# Patient Record
Sex: FEMALE | Race: WHITE
[De-identification: names, ages, dates, MRNs, and addresses within clinical notes are randomized per-mention and may not be internally consistent; named-entity substitution may affect disease eponyms.]

---

## 2019-07-30 ENCOUNTER — HOSPITAL ENCOUNTER (EMERGENCY)
Dept: HOSPITAL 33 - ED | Age: 34
Discharge: HOME | End: 2019-07-30
Payer: COMMERCIAL

## 2019-07-30 VITALS — HEART RATE: 97 BPM | SYSTOLIC BLOOD PRESSURE: 168 MMHG | DIASTOLIC BLOOD PRESSURE: 101 MMHG | OXYGEN SATURATION: 96 %

## 2019-07-30 DIAGNOSIS — K04.7: ICD-10-CM

## 2019-07-30 DIAGNOSIS — K02.9: Primary | ICD-10-CM

## 2019-07-30 PROCEDURE — 99283 EMERGENCY DEPT VISIT LOW MDM: CPT

## 2019-07-30 NOTE — ERPHSYRPT
- History of Present Illness


Time Seen by Provider: 07/30/19 19:15


Source: patient


Exam Limitations: no limitations


Patient Subjective Stated Complaint: Right side of  jaw swollen due to an 

abscessed bottom tooth in the mouth, began 3 days ago, has dentist appt for 

August 26th for an extraction


Triage Nursing Assessment: Pt rates pain 8/10, hypertensive, denies any other 

issues


Physician History: 





34-year-old white female arrives with complaint of pain in her right lower 

mandibular region symptoms for 3 days she states she has some swelling in the 

area.


She states she has an appointment with a dentist on August 26.


She has no fever no nausea no vomiting.


Past medical history is negative.


Past surgical history cholecystectomy and tubal ligation


Timing/Duration: day(s) (3 days)


Severity: moderate


Modifying Factors: Worsens With: eating, immobilization, medication, movement, 

rest, acetaminophen, ibuprofen, nothing


Associated Symptoms: No nausea, No vomiting, No abdominal pain, No shortness of 

breath, No heartburn, No diaphoresis, No cough, No chills, No chest pain, No 

fever, No headaches, No loss of appetite, No malaise, No rash, No syncope, No 

seizure, No weakness


Allergies/Adverse Reactions: 








No Known Drug Allergies Allergy (Unverified 07/30/19 18:56)


 








- Review of Systems


Constitutional: No Fever, No Chills


Eyes: No Symptoms


Ears, Nose, & Throat: Mouth Pain, No Ear Pain, No Ear Discharge, No Hearing 

Changes, No Tinnitus, No Nose Pain, No Nose Congestion, No Nose Discharge, No 

Sinus Drainage, No Epistaxis, No Mouth Swelling, No Loose Teeth, No Throat Pain

, No Throat Swelling, No Hoarse, No Painful Swallowing, No Snoring, No Stridor, 

No Other


Respiratory: No Cough, No Dyspnea


Cardiac: No Chest Pain, No Edema, No Syncope


Abdominal/Gastrointestinal: No Abdominal Pain, No Nausea, No Vomiting, No 

Diarrhea


Genitourinary Symptoms: No Dysuria


Musculoskeletal: No Back Pain, No Neck Pain


Skin: No Rash


Neurological: No Dizziness, No Focal Weakness, No Sensory Changes


Psychological: No Symptoms


Endocrine: No Symptoms


All Other Systems: Reviewed and Negative





- Past Medical History


Pertinent Past Medical History: No





- Past Surgical History


Past Surgical History: Yes


Gastrointestinal: Cholecystectomy


Female Surgical History: Tubal Ligation





- Social History


Smoking Status: Current every day smoker


How long have you smoked: 20 years


Exposure to second hand smoke: Yes


Drug Use: none


Patient Lives Alone: No





- Female History


Hx Pregnant Now: Yes (tubal)





- Nursing Vital Signs


Nursing Vital Signs: 





 Initial Vital Signs











Temperature  98.4 F   07/30/19 18:50


 


Pulse Rate  100 H  07/30/19 18:50


 


Blood Pressure  157/103   07/30/19 18:50


 


O2 Sat by Pulse Oximetry  99   07/30/19 18:50








 Pain Scale











Pain Intensity                 8

















- Physical Exam


General Appearance: mild distress, alert


Eye Exam: PERRL/EOMI, eyes nml inspection


Ears, Nose, Throat Exam: normal ENT inspection, TMs normal, pharynx normal, 

moist mucous membranes, dry mucous membranes, TM abnormal (R), TM abnormal (L), 

pharyngeal erythema, tonsillar exudate, other (carious tooth right posterior 

mandibular region mild edema to face overlying this tender in this area)


Neck Exam: normal inspection, non-tender, supple, full range of motion


Respiratory Exam: normal breath sounds, lungs clear, No respiratory distress


Cardiovascular Exam: regular rate/rhythm, normal heart sounds, normal 

peripheral pulses, capillary refill <2 sec


Gastrointestinal/Abdomen Exam: soft, normal bowel sounds, No tenderness, No mass


Back Exam: normal inspection, normal range of motion, No CVA tenderness, No 

vertebral tenderness


Extremity Exam: normal inspection, normal range of motion, pelvis stable


Neurologic Exam: alert, oriented x 3, cooperative, CNs II-XII nml as tested, 

normal mood/affect, nml cerebellar function, nml station & gait, sensation nml, 

No motor deficits


Skin Exam: normal color, warm, dry, No rash


**SpO2 Interpretation**: normal (99%)


SpO2: 99





- Course


Nursing assessment & vital signs reviewed: Yes





- Progress


Progress: improved


Progress Note: 





07/30/19 19:22


Is a 34-year-old white female previously healthy she arrives with complaint of 

pain in the right posterior mandibular region symptoms for 3 days she has a 

corresponding carious tooth.


She states she's been taking Advil however she states she's not achieving pain 

control with this.


Will go ahead and place patient on amoxicillin 500 mg orally 3 times a day 

Norco for pain.  Inspect it did not find any narcotic prescriptions for this 

patient.








Patient will be encouraged followup with her dentist





- Departure


Departure Disposition: Home


Clinical Impression: 


 Dental caries, Pain, dental, Dental abscess





Condition: Fair


Critical Care Time: No


Referrals: 


DOCTOR,NO FAMILY [Primary Care Provider] - 


Additional Instructions: 


Return home.


Amoxicillin 500 mg orally 3 times a day for 10 days.


Norco as prescribed.


Cold packs to area (external ) 24-48 hours.


Continue Advil every 6 hours as needed.


Avoid excessively hot or cold foods.


Followup with your dentist.


Return for acute distress or for severe symptoms.


Prescriptions: 


Hydrocodone/APAP 5-325 Tab^^^ [Norco 5-325 Tablet^^^] 1 each PO Q4HPRN PRN #12 

tablet MDD 6


 PRN Reason: dental pain


Amoxicillin 500 mg PO TID #30 capsule

## 2019-08-01 ENCOUNTER — HOSPITAL ENCOUNTER (EMERGENCY)
Dept: HOSPITAL 33 - ED | Age: 34
Discharge: HOME | End: 2019-08-01
Payer: COMMERCIAL

## 2019-08-01 VITALS — HEART RATE: 87 BPM | DIASTOLIC BLOOD PRESSURE: 93 MMHG | SYSTOLIC BLOOD PRESSURE: 157 MMHG

## 2019-08-01 VITALS — OXYGEN SATURATION: 99 %

## 2019-08-01 DIAGNOSIS — K04.7: Primary | ICD-10-CM

## 2019-08-01 DIAGNOSIS — K08.89: ICD-10-CM

## 2019-08-01 PROCEDURE — 96372 THER/PROPH/DIAG INJ SC/IM: CPT

## 2019-08-01 PROCEDURE — 99283 EMERGENCY DEPT VISIT LOW MDM: CPT

## 2019-08-01 NOTE — ERPHSYRPT
- History of Present Illness


Time Seen by Provider: 08/01/19 01:50


Source: patient


Patient Subjective Stated Complaint: pt is alert and oriented. pt is ambulatory 

with a steady gait. pt comes in with c/o right jaw pain. pt was seen yesterday 

in the ER for same complaint. pt has worsening swelling and pain. pt states 

that she moved her dental appointment up to next wednesday and went to followup 

at [facility]. pt was given Amoxicillin 500mg in ER yesterday and then 

at the clinic they gave her another round of Amoxicillin 500mg to take after 

her first round. pt has severe swelling to her left jaw and lip.


Triage Nursing Assessment: see above


Physician History: 





34-year-old white female seen 2 days ago secondary to dental caries dental 

abscess patient with pain in the posterior right mandibular region seen here 2 

days ago placed on amoxicillin given [place].


Patient states that she had increased swelling in her mouth she was seen 

yesterday morning at a local clinic.


She tried to contact her dentist in the dentist apparently move her appointment 

up from the end of August 2 next week.





Patient arrives she has a large amount of swelling in her right lateral 

mandibular region.


Patient without fevers no nausea no vomiting.





Past medical history is negative.


Past surgical history includes cholecystectomy and tubal ligation.


Allergies/Adverse Reactions: 








No Known Drug Allergies Allergy (Unverified 07/30/19 18:56)


 





Immunizations Up to Date: Yes





- Review of Systems


Constitutional: No Fever, No Chills


Eyes: No Symptoms


Ears, Nose, & Throat: No Symptoms (again so she every, hears all night I can he 

wants anymore), Mouth Pain, Mouth Swelling, Other (dental pain, increasing 

dental abscess), No Ear Pain, No Ear Discharge, No Hearing Changes, No Tinnitus

, No Nose Congestion, No Nose Discharge, No Sinus Drainage, No Epistaxis, No 

Loose Teeth, No Throat Pain, No Throat Swelling, No Hoarse, No Painful 

Swallowing, No Snoring, No Stridor


Respiratory: No Cough, No Dyspnea


Cardiac: No Chest Pain, No Edema, No Syncope


Abdominal/Gastrointestinal: No Abdominal Pain, No Nausea, No Vomiting, No 

Diarrhea


Genitourinary Symptoms: No Dysuria


Musculoskeletal: No Back Pain, No Neck Pain


Skin: No Rash


Neurological: No Dizziness, No Focal Weakness, No Sensory Changes


Psychological: No Symptoms


Endocrine: No Symptoms


All Other Systems: Reviewed and Negative





- Past Medical History


Pertinent Past Medical History: No





- Past Surgical History


Past Surgical History: Yes


Gastrointestinal: Cholecystectomy


Female Surgical History: Tubal Ligation





- Social History


Smoking Status: Current every day smoker


How long have you smoked: 20 years


Exposure to second hand smoke: Yes


Drug Use: none


Patient Lives Alone: No





- Female History


Hx Pregnant Now: No





- Nursing Vital Signs


Nursing Vital Signs: 


 Initial Vital Signs











Temperature  98.4 F   08/01/19 01:38


 


Pulse Rate  97 H  08/01/19 01:38


 


Respiratory Rate  18   08/01/19 01:38


 


Blood Pressure  122/87   08/01/19 01:38


 


O2 Sat by Pulse Oximetry  99   08/01/19 01:38








 Pain Scale











Pain Intensity                 7

















- Physical Exam


General Appearance: moderate distress, alert


Eye Exam: PERRL/EOMI, eyes nml inspection


Ears, Nose, Throat Exam: other (dental caries right mandibular molars large 

dental abscess right lateral mandibular region)


Neck Exam: normal inspection, non-tender, supple, full range of motion


Respiratory Exam: normal breath sounds, lungs clear, No respiratory distress


Cardiovascular Exam: regular rate/rhythm, normal heart sounds, normal 

peripheral pulses, capillary refill <2 sec


Gastrointestinal/Abdomen Exam: soft, normal bowel sounds, No tenderness, No mass


Back Exam: normal inspection, normal range of motion, No CVA tenderness, No 

vertebral tenderness


Extremity Exam: normal inspection, normal range of motion, pelvis stable


Neurologic Exam: alert, oriented x 3, cooperative, CNs II-XII nml as tested, 

normal mood/affect, nml cerebellar function, nml station & gait, sensation nml, 

No motor deficits


Skin Exam: normal color, warm, dry, No rash


**SpO2 Interpretation**: normal (99%)


SpO2: 99





- Course


Nursing assessment & vital signs reviewed: Yes


Ordered Tests: 


 Active Orders 24 hr











 Category Date Time Status


 


 IV Insertion STAT Care  08/01/19 01:54 Active








Medication Summary














Discontinued Medications














Generic Name Dose Route Start Last Admin





  Trade Name Freq  PRN Reason Stop Dose Admin


 


Clindamycin Phosphate  600 mg  08/01/19 02:36  08/01/19 03:00





  Cleocin Phosphate Iv 600 Mg/4 Ml***  IM  08/01/19 02:37  600 mg





  STAT ONE   Administration





     





     





     





     


 


Clindamycin Phosphate  Confirm  08/01/19 02:40  





  Cleocin Phosphate Iv 600 Mg/4 Ml***  Administered  08/01/19 02:41  





  Dose   





  600 mg   





  .ROUTE   





  .STK-MED ONE   





     





     





     





     


 


Clindamycin HCl/Dextrose  600 mg in 50 mls @ 100 mls/hr  08/01/19 01:53  08/01/ 19 02:34





  Clindamycin-D5w 600 Mg/50 Ml***  IV  08/01/19 02:22  Not Given





  STAT STA   





     





     





     





     


 


Clindamycin HCl/Dextrose  Confirm  08/01/19 02:15  





  Clindamycin-D5w 600 Mg/50 Ml***  Administered  08/01/19 02:16  





  Dose   





  600 mg in 50 mls @ ud   





  IV   





  .STK-MED ONE   





     





     





     





     














- Progress


Progress: improved


Progress Note: 





08/01/19 01:58


34-year-old white female seen here 2 days ago secondary to dental abscess she 

is having increasing swelling in the right mandibular region continued pain in 

the right posterior molar secondary to a carious tooth.


Patient is on amoxicillin she apparently was seen by a local clinic and was 

given antibiotics to take after the antibiotics I have prescribed 2 days ago 

but they were the same ones.





Will go ahead and write for clindamycin 600 mg im and planfor  the patient to 

go home with clindamycin 300 mg orally 3 times a day for 10 days she is to 

place cold packs on the area.


I have recommended that the patient to followup with a dentist.


08/01/19 02:54








- Departure


Departure Disposition: Home


Clinical Impression: 


 Dental abscess, Pain, dental





Condition: Fair


Critical Care Time: No


Referrals: 


DOCTOR,NO FAMILY [Primary Care Provider] - 


Additional Instructions: 


Return home.


Clindamycin 300 mg orally 3 times a day for 10 days.


Cold packs to the area 24-48 hours.  (External)


Continue Norco as prescribed and or Advil every 6 hours as needed for pain.


Followup with your dentist.


Return for acute distress severe symptoms or for any problems.





Prescriptions: 


Clindamycin HCl 300 mg PO TID #30 capsule

## 2021-04-22 ENCOUNTER — HOSPITAL ENCOUNTER (EMERGENCY)
Dept: HOSPITAL 33 - ED | Age: 36
Discharge: HOME | End: 2021-04-22
Payer: COMMERCIAL

## 2021-04-22 VITALS — SYSTOLIC BLOOD PRESSURE: 164 MMHG | OXYGEN SATURATION: 98 % | HEART RATE: 84 BPM | DIASTOLIC BLOOD PRESSURE: 95 MMHG

## 2021-04-22 DIAGNOSIS — K04.7: Primary | ICD-10-CM

## 2021-04-22 PROCEDURE — 99283 EMERGENCY DEPT VISIT LOW MDM: CPT

## 2021-04-22 NOTE — ERPHSYRPT
- History of Present Illness


Time Seen by Provider: 04/22/21 19:42


Source: patient


Exam Limitations: no limitations


Patient Subjective Stated Complaint: pt states she has bery bad teeth and has 

been having pain for approx 3 days. states she has a dentist appt set up but not

until next week


Triage Nursing Assessment: pt alert and oriented, answers questions approp. pt 

ambulatory with steady gait noted, respirations nonlabored with lungs cta. skin 

warm and dry. broken teeth noted to lt upper mouth.


Physician History: 





This is a 36-year-old obese white female who has known chronic poor dentition 

and presents with left upper molar region dental pain.  Patient has a dentist 

appointment next week.  She has been using ibuprofen and naproxen as well as 

Orajel over-the-counter.  This is not helping her pain.  She has not had a 

fever.  And there are no intraoral/gingival abscesses present


Severity: mild


ENT Location: dental


Prearrival Treatment: over the counter meds


Associated Symptoms: tooth pain


Allergies/Adverse Reactions: 








No Known Drug Allergies Allergy (Verified 04/22/21 19:36)


   





Home Medications: 








Alprazolam [Xanax] 0.5 mg PO BIDPRN PRN 04/22/21 [History]


Venlafaxine HCl ER 75 mg*** [Effexor XR 75 MG***] 75 mg PO DAILY 04/22/21 

[History]





Hx Tetanus, Diphtheria Vaccination/Date Given: Yes


Hx Influenza Vaccination/Date Given: No


Hx Pneumococcal Vaccination/Date Given: No


Immunizations Up to Date: Yes





Travel Risk





- International Travel


Have you traveled outside of the country in past 3 weeks: No





- Coronavirus Screening


Are you exhibiting any of the following symptoms?: No


Close contact with a COVID-19 positive Pt in past 14-21 Days: No





- Vaccine Status


Have you recieved a Covid-19 vaccination: No





- Review of Systems


Constitutional: No Symptoms


Eyes: No Symptoms


Ears, Nose, & Throat: Other (Dental pain)


Respiratory: No Symptoms


Cardiac: No Symptoms


Abdominal/Gastrointestinal: No Symptoms


Genitourinary Symptoms: No Symptoms


Musculoskeletal: No Symptoms


Skin: No Symptoms


Neurological: No Symptoms


Psychological: No Symptoms


Endocrine: No Symptoms


Hematologic/Lymphatic: No Symptoms


Immunological/Allergic: No Symptoms


All Other Systems: Reviewed and Negative





- Past Medical History


Pertinent Past Medical History: No





- Past Surgical History


Past Surgical History: Yes


Gastrointestinal: Cholecystectomy


Female Surgical History: Tubal Ligation





- Social History


Smoking Status: Current every day smoker


How long have you smoked: 20 years


Exposure to second hand smoke: Yes


Drug Use: none


Patient Lives Alone: No





- Female History


Hx Last Menstrual Period: 2 days


Hx Pregnant Now: No





- Nursing Vital Signs


Nursing Vital Signs: 


                               Initial Vital Signs











Temperature  97.9 F   04/22/21 19:26


 


Pulse Rate  90   04/22/21 19:26


 


Respiratory Rate  18   04/22/21 19:26


 


Blood Pressure  183/105   04/22/21 19:26


 


O2 Sat by Pulse Oximetry  97   04/22/21 19:26








                                   Pain Scale











Pain Intensity                 7

















- Physical Exam


General Appearance: no apparent distress, alert, anxiety


Eye Exam: bilateral eye: normal inspection, PERRL, EOMI


Ear Exam: bilateral ear: auricle normal


Nasal Exam: normal inspection


Throat Exam: pharynx normal, dental tenderness (Patient has most tenderness in 

the left upper molar regions.  She has generalized dental decay with multiple 

fractured teeth present)


Neck Exam: normal inspection, non-tender, supple, full range of motion, trachea 

midline


Cardiovascular/Respiratory Exam: chest non-tender, normal breath sounds, regular

 rate/rhythm, heart sounds normal, No no ecchymosis, No no respiratory distress


Abdominal Exam: non-tender


Neurologic Exam: alert, oriented x 3, cooperative, CNs II-XII nml as tested, 

normal mood/affect, nml cerebellar function, nml station & gait, sensation nml


Skin Exam: normal color, warm, dry


**SpO2 Interpretation**: normal


SpO2: 97


O2 Delivery: Room Air





- Progress


Progress: unchanged


Counseled pt/family regarding: diagnosis, need for follow-up





- Departure


Departure Disposition: Home


Clinical Impression: 


 Dental infection





Condition: Stable


Critical Care Time: No


Referrals: 


YAA PAYNE [Primary Care Provider] - 


Additional Instructions: 


Continue using ibuprofen or naproxen (not both) for pain control.  May also 

continue using Orajel.  After you use the take-home Norco tablets, began adding 

Tylenol 500 mg orally every 6 hours to pain control regimen.  Call your dentist 

or primary care doctor for further management of your pain.  Follow-up with a 

dentist for definitive care.


Prescriptions: 


Amoxicillin 500 mg Cap*** [Amoxil 500 mg***] 500 mg PO TID #30 capsule

## 2021-06-08 ENCOUNTER — HOSPITAL ENCOUNTER (EMERGENCY)
Dept: HOSPITAL 33 - ED | Age: 36
Discharge: HOME | End: 2021-06-08
Payer: COMMERCIAL

## 2021-06-08 VITALS — DIASTOLIC BLOOD PRESSURE: 98 MMHG | SYSTOLIC BLOOD PRESSURE: 164 MMHG | HEART RATE: 84 BPM | OXYGEN SATURATION: 98 %

## 2021-06-08 DIAGNOSIS — R10.9: ICD-10-CM

## 2021-06-08 DIAGNOSIS — N39.0: Primary | ICD-10-CM

## 2021-06-08 LAB
GLUCOSE UR-MCNC: NEGATIVE MG/DL
PROT UR STRIP-MCNC: 30 MG/DL
RBC #/AREA URNS HPF: (no result) /HPF (ref 0–2)
WBC #/AREA URNS HPF: >100 /HPF (ref 0–5)

## 2021-06-08 PROCEDURE — 87186 SC STD MICRODIL/AGAR DIL: CPT

## 2021-06-08 PROCEDURE — 84703 CHORIONIC GONADOTROPIN ASSAY: CPT

## 2021-06-08 PROCEDURE — 99284 EMERGENCY DEPT VISIT MOD MDM: CPT

## 2021-06-08 PROCEDURE — 87086 URINE CULTURE/COLONY COUNT: CPT

## 2021-06-08 PROCEDURE — 81001 URINALYSIS AUTO W/SCOPE: CPT

## 2021-06-08 PROCEDURE — 96372 THER/PROPH/DIAG INJ SC/IM: CPT

## 2021-06-08 PROCEDURE — 87077 CULTURE AEROBIC IDENTIFY: CPT

## 2021-06-08 PROCEDURE — 74176 CT ABD & PELVIS W/O CONTRAST: CPT

## 2021-06-08 NOTE — XRAY
Indication: Right flank pain.  Hematuria.



Multiple contiguous axial images obtained through the abdomen and pelvis

without contrast using renal stone protocol.



Comparison: None



Lung bases demonstrates medial right middle lobe subsegmental

atelectasis/scarring.  No infiltrate or effusion.  Heart not enlarged.



No renal calculus or evidence for obstructive uropathy in either  system.

Stomach is distended with food.  Noncontrasted stomach and bowel loops appear

nonobstructed.  Normal appendix.  Previous cholecystectomy.  No free fluid/air.



Remaining liver, pancreas, spleen, adrenal glands, kidneys, ureters, bladder,

uterus, and aorta appear unremarkable for noncontrast exam.



Osseous structures intact.  No ventral or inguinal hernias.



Impression: Negative CT abdomen/pelvis without contrast exam.

## 2021-06-08 NOTE — ERPHSYRPT
- History of Present Illness


Time Seen by Provider: 06/08/21 12:30


Source: patient


Exam Limitations: no limitations


Patient Subjective Stated Complaint: to er c/o severe right side flank and 

pelvic pain x 4 days. pt reports nausea present and frequent urination


Triage Nursing Assessment: To er c/o right flank sharp stabbing pains with 

nausea/ pt arrives p/w/d resp easy a20x3. pt apprears to be uncomfortable in 

cot. pt /106 pt states this is not normal for her.


Physician History: 


Patient is a 36-year-old female presents to our ED with complaints of right-

sided flank pain that has been ongoing for 4 days.  Patient also associates 

frequency and dysuria with her symptoms.  Patient describes a pressure sensation

over her bladder.  Symptoms started gradually and progressed in intensity.  No 

trauma.  No fever.  No nausea or vomiting.  Symptoms are progressive.  Symptoms 

are moderate in intensity.  No specific worsening or improving factors.  Patient

voices no other complaints or concerns at this time.





Timing/Duration: day(s) (4 days ago)


Severity: moderate


Modifying Factors: Improves With: nothing


Associated Symptoms: nausea (Occasional nausea.  Not nauseous at this time.)


Allergies/Adverse Reactions: 








No Known Drug Allergies Allergy (Verified 04/22/21 19:36)


   





Home Medications: 








Alprazolam [Xanax] 0.5 mg PO BIDPRN PRN 04/22/21 [History]


Venlafaxine HCl ER 75 mg*** [Effexor XR 75 MG***] 75 mg PO DAILY 04/22/21 

[History]





Hx Tetanus, Diphtheria Vaccination/Date Given: Yes


Hx Influenza Vaccination/Date Given: No


Hx Pneumococcal Vaccination/Date Given: No





Travel Risk





- International Travel


Have you traveled outside of the country in past 3 weeks: No





- Coronavirus Screening


Are you exhibiting any of the following symptoms?: No


Close contact with a COVID-19 positive Pt in past 14-21 Days: No





- Vaccine Status


Have you recieved a Covid-19 vaccination: No





- Review of Systems


Constitutional: No Symptoms, No Fever, No Chills


Eyes: No Symptoms


Ears, Nose, & Throat: No Symptoms


Respiratory: No Symptoms, No Cough, No Dyspnea


Cardiac: No Symptoms, No Chest Pain, No Edema, No Syncope


Abdominal/Gastrointestinal: No Symptoms, No Abdominal Pain, No Nausea, No 

Vomiting, No Diarrhea


Genitourinary Symptoms: No Symptoms, No Dysuria


Musculoskeletal: No Symptoms, No Back Pain, No Neck Pain


Skin: No Symptoms, No Rash


Neurological: No Symptoms, No Dizziness, No Focal Weakness, No Sensory Changes


Psychological: No Symptoms


Endocrine: No Symptoms


Hematologic/Lymphatic: No Symptoms


Immunological/Allergic: No Symptoms


All Other Systems: Reviewed and Negative





- Past Medical History


Pertinent Past Medical History: No





- Past Surgical History


Past Surgical History: Yes


Gastrointestinal: Cholecystectomy


Female Surgical History: Tubal Ligation





- Social History


Smoking Status: Current every day smoker


How long have you smoked: 20 years


Exposure to second hand smoke: Yes


Drug Use: none


Patient Lives Alone: No





- Female History


Hx Last Menstrual Period: 5/18/21


Hx Pregnant Now: No





- Nursing Vital Signs


Nursing Vital Signs: 





                               Initial Vital Signs











Temperature  98.4 F   06/08/21 12:08


 


Pulse Rate  89   06/08/21 12:08


 


Respiratory Rate  22   06/08/21 12:08


 


Blood Pressure  171/106   06/08/21 12:08


 


O2 Sat by Pulse Oximetry  99   06/08/21 12:08








                                   Pain Scale











Pain Intensity                 4

















- Physical Exam


General Appearance: no apparent distress, alert


Eye Exam: PERRL/EOMI, eyes nml inspection


Ears, Nose, Throat Exam: normal ENT inspection, TMs normal, pharynx normal, 

moist mucous membranes


Neck Exam: normal inspection, non-tender, supple, full range of motion


Respiratory Exam: normal breath sounds, lungs clear, No respiratory distress


Cardiovascular Exam: regular rate/rhythm, normal heart sounds, normal peripheral

 pulses


Gastrointestinal/Abdomen Exam: soft, normal bowel sounds, other (Mild right-

sided costovertebral angle tenderness..  Overlying soft tissue intact.  No signs

 of trauma.), No tenderness, No mass


Back Exam: normal inspection, normal range of motion, No CVA tenderness, No 

vertebral tenderness


Extremity Exam: normal inspection, normal range of motion, pelvis stable


Neurologic Exam: alert, oriented x 3, cooperative, normal mood/affect, nml 

cerebellar function, nml station & gait, sensation nml, No motor deficits


Skin Exam: normal color, warm, dry, No rash


Lymphatic Exam: No adenopathy


**SpO2 Interpretation**: normal


SpO2: 98


O2 Delivery: Room Air





- Course


Nursing assessment & vital signs reviewed: Yes





- CT Exams


  ** Abdomen/Pelvis


CT Interpretation: Tele-radiologist Report (CT abdomen pelvis without contrast 

is negative)


Ordered Tests: 





                               Active Orders 24 hr











 Category Date Time Status


 


 ABDOMEN AND PELVIS W/0 CONTRAS [CT] Stat Exams  06/08/21 12:29 Completed


 


 CULTURE,URINE Stat Lab  06/08/21 12:30 Received


 


 HCG,QUALITATIVE URINE Stat Lab  06/08/21 12:30 Completed


 


 UA W/RFX UR CULTURE Stat Lab  06/08/21 12:30 Completed








Medication Summary














Discontinued Medications














Generic Name Dose Route Start Last Admin





  Trade Name Maxq  PRN Reason Stop Dose Admin


 


Ketorolac Tromethamine  60 mg  06/08/21 13:34 





  Toradol 30 Mg Injection***  IM  06/08/21 13:35 





  STAT ONE  


 


Nitrofurantoin Macrocrystals  100 mg  06/08/21 13:15  06/08/21 13:17





  Macrobid 100mg Capsule***  PO  06/08/21 13:16  100 mg





  STAT ONE   Administration


 


Nitrofurantoin Macrocrystals  Confirm  06/08/21 13:17 





  Macrobid 100mg Capsule***  Administered  06/08/21 13:18 





  Dose  





  100 mg  





  .ROUTE  





  .YASSSU-MED ONE  











Lab/Rad Data: 





                               Laboratory Results











  06/08/21 06/08/21 Range/Units





  12:30 12:30 


 


Urine Color   YELLOW  (YELLOW)  


 


Urine Appearance   CLOUDY  (CLEAR)  


 


Urine pH   8.0  (5-6)  


 


Ur Specific Gravity   1.009  (1.005-1.025)  


 


Urine Protein   30  (Negative)  


 


Urine Ketones   NEGATIVE  (NEGATIVE)  


 


Urine Blood   MODERATE  (0-5)  Tristian/ul


 


Urine Nitrite   NEGATIVE  (NEGATIVE)  


 


Urine Bilirubin   NEGATIVE  (NEGATIVE)  


 


Urine Urobilinogen   NEGATIVE  (0-1)  mg/dL


 


Ur Leukocyte Esterase   LARGE  (NEGATIVE)  


 


Urine WBC (Auto)   >100  (0-5)  /HPF


 


Urine RBC (Auto)   26-50  (0-2)  /HPF


 


U Epithel Cells (Auto)   RARE  (FEW)  /HPF


 


Urine Bacteria (Auto)   FEW  (NEGATIVE)  /HPF


 


Urine Culture Reflexed   YES  (NO)  


 


Urine Glucose   NEGATIVE  (NEGATIVE)  mg/dL


 


Urine HCG, Qual  NEGATIVE   (Negative)  














- Progress


Progress: improved


Progress Note: 


Patient received Toradol for pain control.  CT abdomen pelvis without contrast 

negative for nephrolithiasis.  UA significant for urinary tract infection.  

Patient received a dose of Macrobid orally in our ED.  A prescription for the 

same will be forwarded the patient's pharmacy.  Toradol will be forwarded to 

patient's pharmacy as well for pain control.  Patient agrees to follow-up with 

her primary care doctor within 48 hours for evaluation.  Patient voiced no other

 complaints concerns at this time.





Portions of this note were created with voice recognition technology.  There may

 be grammatical, spelling, punctuation or sound alike errors


06/08/21 13:40





Counseled pt/family regarding: lab results, diagnosis, need for follow-up, rad 

results





- Departure


Departure Disposition: Home


Clinical Impression: 


 UTI (urinary tract infection), Flank pain





Condition: Stable


Critical Care Time: No


Referrals: 


YAA PAYNE [Primary Care Provider] - 


Additional Instructions: 


Discharge/Care Plan





YOU NICOLE was seen on 06/08/21 in the Emergency Room. The patient was 

counseled regarding Diagnosis,Lab results, Imaging studies, need for follow up 

and when to return to the Emergency Room.





Prescriptions given:





Discharge Note





I have spoken with the patient and/or caregivers. I have explained the patient's

condition, diagnosis and treatment plan based on the information available to me

at this time. I have answered the patient's and/or caregiver's questions and 

addressed any concerns. The patient and/or caregivers have as good understanding

of the patient's diagnosis, condition and treatment plan as can be expected at 

this point. The vital signs have been stable. The patient's condition is stable 

and appropriate for discharge from the emergency department.





The patient will pursue further outpatient evaluation with the primary care 

physician or other designated or consulting physician as outlined in the 

discharge instructions. The patient and/or caregivers are agreeable to this plan

of care and follow-up instructions have been explained in detail. The patient 

and/or caregivers have received these instruction. The patient/and or caregivers

are aware that any significant change in condition or worsening of symptoms 

should prompt an immediate return to this or the closest emergency department or

call 911. 








Prescriptions: 


Nitrofurantoin Monohyd/M-Cryst [Macrobid 100 mg Capsule] 100 mg PO BID 7 Days 

#14 capsule


Ketorolac Tromethamine [Toradol] 10 mg PO TID 5 Days #15 tablet

## 2023-07-27 ENCOUNTER — HOSPITAL ENCOUNTER (EMERGENCY)
Dept: HOSPITAL 33 - ED | Age: 38
Discharge: HOME | End: 2023-07-27
Payer: COMMERCIAL

## 2023-07-27 VITALS — TEMPERATURE: 97.3 F

## 2023-07-27 VITALS — RESPIRATION RATE: 18 BRPM | HEART RATE: 84 BPM | SYSTOLIC BLOOD PRESSURE: 143 MMHG | DIASTOLIC BLOOD PRESSURE: 90 MMHG

## 2023-07-27 VITALS — OXYGEN SATURATION: 97 %

## 2023-07-27 DIAGNOSIS — Z79.899: ICD-10-CM

## 2023-07-27 DIAGNOSIS — S32.029A: ICD-10-CM
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
## 2023-07-27 DIAGNOSIS — S32.019A: ICD-10-CM

## 2023-07-27 DIAGNOSIS — S60.222A: Primary | ICD-10-CM

## 2023-07-27 DIAGNOSIS — Z79.891: ICD-10-CM

## 2023-07-27 PROCEDURE — 99285 EMERGENCY DEPT VISIT HI MDM: CPT

## 2023-07-27 PROCEDURE — 73130 X-RAY EXAM OF HAND: CPT

## 2023-07-27 PROCEDURE — 72100 X-RAY EXAM L-S SPINE 2/3 VWS: CPT

## 2023-07-27 PROCEDURE — 96372 THER/PROPH/DIAG INJ SC/IM: CPT

## 2023-07-27 PROCEDURE — 72192 CT PELVIS W/O DYE: CPT

## 2023-07-27 PROCEDURE — 72131 CT LUMBAR SPINE W/O DYE: CPT

## 2023-07-27 PROCEDURE — 73502 X-RAY EXAM HIP UNI 2-3 VIEWS: CPT
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
## 2023-10-05 ENCOUNTER — HOSPITAL ENCOUNTER (EMERGENCY)
Dept: HOSPITAL 33 - ED | Age: 38
Discharge: HOME | End: 2023-10-05
Payer: COMMERCIAL

## 2023-10-05 VITALS — HEART RATE: 48 BPM | SYSTOLIC BLOOD PRESSURE: 187 MMHG | OXYGEN SATURATION: 98 % | DIASTOLIC BLOOD PRESSURE: 105 MMHG

## 2023-10-05 VITALS — TEMPERATURE: 98.8 F | RESPIRATION RATE: 16 BRPM

## 2023-10-05 DIAGNOSIS — Z79.899: ICD-10-CM

## 2023-10-05 DIAGNOSIS — K04.7: Primary | ICD-10-CM

## 2023-10-05 DIAGNOSIS — K08.89: ICD-10-CM

## 2023-10-05 PROCEDURE — 99282 EMERGENCY DEPT VISIT SF MDM: CPT

## 2023-10-05 NOTE — ERPHSYRPT
- History of Present Illness


Time Seen by Provider: 10/05/23 18:35


Source: patient


Exam Limitations: no limitations


Physician History: 





This is an obese 38-year-old white female patient who presents with several day 

history of left lower molar pain.  The pain is not responding to amoxicillin and

ibuprofen.  She has a fractured tooth.  She does not have dental insurance.


Timing/Duration: gradual onset


ENT Location: dental (Of the lower molars)


Prearrival Treatment: over the counter meds


Associated Symptoms: jaw pain (Left mandibular), tooth pain (Lower molars)


Allergies/Adverse Reactions: 








No Known Drug Allergies Allergy (Verified 10/05/23 18:30)


   





Home Medications: 








ALPRAZolam [Xanax] 0.5 mg PO TIDPRN PRN 04/22/21 [History]


Venlafaxine HCl ER 75 mg*** [Effexor XR 75 MG***] 75 mg PO DAILY 04/22/21 

[History]


Simvastatin 10 mg** [Zocor 10MG**] 10 mg PO HS 07/27/23 [History]





Hx Tetanus, Diphtheria Vaccination/Date Given: Yes


Hx Influenza Vaccination/Date Given: Yes


Hx Pneumococcal Vaccination/Date Given: No





Travel Risk





- International Travel


Have you traveled outside of the country in past 3 weeks: No





- Coronavirus Screening


Are you exhibiting any of the following symptoms?: No


Close contact with a COVID-19 positive Pt in past 14-21 Days: No





- Vaccine Status


Have you recieved a Covid-19 vaccination: Yes


: Unknown





- Vaccination Dates


Dates if Unknown: ?





- Review of Systems


Constitutional: No Symptoms


Eyes: No Symptoms


Ears, Nose, & Throat: Other (Dental painleft lower molars)


Respiratory: No Symptoms


Cardiac: No Symptoms


Abdominal/Gastrointestinal: No Symptoms


Genitourinary Symptoms: No Symptoms


Musculoskeletal: No Symptoms


Skin: No Symptoms


Neurological: No Symptoms


Psychological: No Symptoms


Endocrine: No Symptoms


Hematologic/Lymphatic: No Symptoms


Immunological/Allergic: No Symptoms


All Other Systems: Reviewed and Negative





- Past Medical History


Pertinent Past Medical History: Yes


GI Medical History: Gallbladder Disease


Psycho-Social History: Depression





- Past Surgical History


Past Surgical History: Yes


Gastrointestinal: Cholecystectomy


Female Surgical History: Tubal Ligation





- Social History


Smoking Status: Former smoker


How long have you smoked: 20 years


Exposure to second hand smoke: Yes


Drug Use: none


Patient Lives Alone: Yes





- Nursing Vital Signs


Nursing Vital Signs: 





                               Initial Vital Signs











Temperature  98.8 F   10/05/23 18:24


 


Pulse Rate  87   10/05/23 18:24


 


Respiratory Rate  16   10/05/23 18:24


 


Blood Pressure  184/116   10/05/23 18:24


 


O2 Sat by Pulse Oximetry  99   10/05/23 18:24








                                   Pain Scale











Pain Intensity                 8

















- Physical Exam


General Appearance: no apparent distress, alert, anxiety, obese


Eye Exam: bilateral eye: normal inspection, PERRL, EOMI


Ear Exam: bilateral ear: auricle normal


Nasal Exam: normal inspection


Throat Exam: dental tenderness (Left lower molars)


Neck Exam: normal inspection, non-tender, supple, full range of motion, No 

lymphadenopathy (R), No lymphadenopathy (L)


Cardiovascular/Respiratory Exam: chest non-tender, no respiratory distress


Abdominal Exam: non-tender


Neurologic Exam: alert, oriented x 3, cooperative, CNs II-XII nml as tested, 

normal mood/affect, nml cerebellar function, nml station & gait, sensation nml


Skin Exam: normal color, warm, dry


**SpO2 Interpretation**: normal


SpO2: 99


O2 Delivery: Room Air





- Course


Nursing assessment & vital signs reviewed: Yes





- Progress


Progress: unchanged


Progress Note: 





10/05/23 18:42


This patient's medical issue is low complexity.  The level of complexity in the 

work-up performed is based on review of the patient's past medical history, 

review of the patient's medication list, review the patient's drug allergy list,

 history of present illness and physical findings on examination.  No 

radiographic or laboratory studies are necessary in this patient.  Patient was 

given amoxicillin 500 mg orally and 1 tablet of Percocet 5/325 orally here in 

the emergency department.  We we will remotely send a 10-day prescription 

amoxicillin 500 mg orally to her pharmacy.  She is to follow-up with a dentist 

for definitive care.


Counseled pt/family regarding: diagnosis, need for follow-up





Medical Desision Making





- Independent Historian


Additional History obtained from: Spouse





- Diagnostic Testing


Diagnostic test were ordered, analyzed, and reviewed by me: No





- Risk of complications


The pt has a mod risk of morbidity or mortality based on: Need for prescription 

drug management





- Departure


Departure Disposition: Home


Clinical Impression: 


 Dental infection





Condition: Stable


Critical Care Time: No


Referrals: 


YAA PAYNE [Primary Care Provider] - Follow up/PCP as directed


Additional Instructions: 


Take your antibiotics as prescribed.  Alternate Tylenol and ibuprofen as 

discussed.  Call the dentist tomorrow, 10/6/2023, to make arrangements for an 

appointment for definitive care.


Prescriptions: 


Amoxicillin 500 mg Cap*** [Amoxil 500 mg***] 500 mg PO TID #30 cap